# Patient Record
(demographics unavailable — no encounter records)

---
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
## 2025-03-06 NOTE — ASSESSMENT
[FreeTextEntry1] : The patient was advised of the diagnosis. The natural history of the pathology was explained in full to the patient in layman's terms. All questions were answered. The risks and benefits of surgical and non-surgical treatment alternatives were explained in full to the patient.  Kephlex, Bactrim rx provided  Discussed the immediate need to return to office if symptoms of infection worsen.  Type 1 diabetes with infection - high risk  RTO 4 days

## 2025-03-06 NOTE — IMAGING
[Right] : right elbow [There are no fractures, subluxations or dislocations. No significant abnormalities are seen] : There are no fractures, subluxations or dislocations. No significant abnormalities are seen [FreeTextEntry1] : no acute fractures appreciated  [de-identified] : Right elbow Erythema, warmth, swelling around elbow, improved since Sunday as per patient  DIEUDONNE ROACH

## 2025-03-10 NOTE — IMAGING
[de-identified] : Right elbow Erythema, warmth, swelling around elbow, continues to improve  NVID FAROM

## 2025-03-10 NOTE — ASSESSMENT
[FreeTextEntry1] : The patient was advised of the diagnosis. The natural history of the pathology was explained in full to the patient in layman's terms. All questions were answered. The risks and benefits of surgical and non-surgical treatment alternatives were explained in full to the patient.  Keflex, Bactrim rx provided  Discussed the immediate need to return to office if symptoms of infection worsen.  Type 1 diabetes with infection - high risk  RTO 10 days
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
